# Patient Record
Sex: MALE | Race: WHITE | URBAN - METROPOLITAN AREA
[De-identification: names, ages, dates, MRNs, and addresses within clinical notes are randomized per-mention and may not be internally consistent; named-entity substitution may affect disease eponyms.]

---

## 2017-04-04 ENCOUNTER — OFFICE VISIT (OUTPATIENT)
Dept: FAMILY MEDICINE | Facility: CLINIC | Age: 47
End: 2017-04-04

## 2017-04-04 VITALS
SYSTOLIC BLOOD PRESSURE: 136 MMHG | DIASTOLIC BLOOD PRESSURE: 82 MMHG | BODY MASS INDEX: 31.48 KG/M2 | OXYGEN SATURATION: 95 % | HEART RATE: 88 BPM | WEIGHT: 204 LBS

## 2017-04-04 DIAGNOSIS — I10 BENIGN ESSENTIAL HYPERTENSION: ICD-10-CM

## 2017-04-04 PROCEDURE — 99213 OFFICE O/P EST LOW 20 MIN: CPT | Performed by: FAMILY MEDICINE

## 2017-04-04 RX ORDER — CYCLOBENZAPRINE HCL 10 MG
10 TABLET ORAL
COMMUNITY
Start: 2017-03-20 | End: 2017-07-18

## 2017-04-04 RX ORDER — LISINOPRIL 10 MG/1
TABLET ORAL
Qty: 90 TABLET | Refills: 1 | Status: SHIPPED | OUTPATIENT
Start: 2017-04-04 | End: 2017-10-28

## 2017-04-04 NOTE — MR AVS SNAPSHOT
After Visit Summary   4/4/2017    Ulises Matthew    MRN: [id number]           Patient Information     Date Of Birth          1970        Visit Information        Provider Department      4/4/2017 2:15 PM Gabriele Heath MD [facility]        Today's Diagnoses     Benign essential hypertension          Care Instructions      Hypertension   What is hypertension?   Hypertension is blood pressure that keeps being higher than normal. Blood pressure is the force of blood against artery walls as the heart pumps blood through the body. Blood pressure can be unhealthy if it is above 120/80. The higher your blood pressure, the greater the health risk.   High blood pressure can be controlled if you take these steps:   Maintain a healthy weight.   Are physically active.   Follow a healthy eating plan, which includes foods that do not have a lot of salt and sodium.   Do not drink a lot of alcohol.   Our goal is to keep the systolic (top) number 138 or lower and the diastolic (bottom) number 83 or lower          Follow-ups after your visit        Who to contact     If you have questions or need follow up information about today's clinic visit or your schedule please contact [facility] directly at [phone].  Normal or non-critical lab and imaging results will be communicated to you by Skeeblehart, letter or phone within 4 business days after the clinic has received the results. If you do not hear from us within 7 days, please contact the clinic through Addvocatet or phone. If you have a critical or abnormal lab result, we will notify you by phone as soon as possible.  Submit refill requests through Ampex or call your pharmacy and they will forward the refill request to us. Please allow 3 business days for your refill to be completed.          Additional Information About Your Visit        SkeebleharMacroSolve Information     Ampex gives you secure access to your electronic health record. If you  see a primary care provider, you can also send messages to your care team and make appointments. If you have questions, please call your primary care clinic.  If you do not have a primary care provider, please call [phone] and they will assist you.        Care EveryWhere ID     This is your Care EveryWhere ID. This could be used by other organizations to access your [place] medical records  [id number]        Your Vitals Were     Pulse Pulse Oximetry BMI (Body Mass Index)             88 95% 31.48 kg/m2          Blood Pressure from Last 3 Encounters:   04/04/17 136/82   07/12/16 136/86   06/09/16 (!) 189/100    Weight from Last 3 Encounters:   04/04/17 92.5 kg (204 lb)   07/12/16 91.6 kg (202 lb)   06/09/16 90.7 kg (200 lb)              Today, you had the following     No orders found for display         Today's Medication Changes          These changes are accurate as of: 4/4/17  2:32 PM.  If you have any questions, ask your nurse or doctor.               These medicines have changed or have updated prescriptions.        Dose/Directions    lisinopril 10 MG tablet   Commonly known as:  PRINIVIL/ZESTRIL   This may have changed:  medication strength   Used for:  Benign essential hypertension   Changed by:  Gabriele Heath MD        TAKE 1 TABLET (5 MG) BY MOUTH DAILY   Quantity:  90 tablet   Refills:  1            Where to get your medicines      These medications were sent to [facility][id number] - [place], [place] - [address]  [address] [facility] [address]     Phone:  [phone]     lisinopril 10 MG tablet                Primary Care Provider Office Phone # Fax #    Gabriele Heath [phone]       [facility] [address]  [facility] [address]        Thank you!     Thank you for choosing [facility]  for your care. Our goal is always to provide you with excellent care. Hearing back from our patients is one way we can continue to improve our  services. Please take a few minutes to complete the written survey that you may receive in the mail after your visit with us. Thank you!             Your Updated Medication List - Protect others around you: Learn how to safely use, store and throw away your medicines at www.disposemymeds.org.          This list is accurate as of: 4/4/17  2:32 PM.  Always use your most recent med list.                   Brand Name Dispense Instructions for use    cyclobenzaprine 10 MG tablet    FLEXERIL     Take 10 mg by mouth Reported on 4/4/2017       lisinopril 10 MG tablet    PRINIVIL/ZESTRIL    90 tablet    TAKE 1 TABLET (5 MG) BY MOUTH DAILY

## 2017-04-04 NOTE — PATIENT INSTRUCTIONS
Hypertension   What is hypertension?   Hypertension is blood pressure that keeps being higher than normal. Blood pressure is the force of blood against artery walls as the heart pumps blood through the body. Blood pressure can be unhealthy if it is above 120/80. The higher your blood pressure, the greater the health risk.   High blood pressure can be controlled if you take these steps:   Maintain a healthy weight.   Are physically active.   Follow a healthy eating plan, which includes foods that do not have a lot of salt and sodium.   Do not drink a lot of alcohol.   Our goal is to keep the systolic (top) number 138 or lower and the diastolic (bottom) number 83 or lower

## 2017-04-04 NOTE — PROGRESS NOTES
he has Hypertension which is currently not well controlled. he has been compliant with his medications and is here today to follow up on the this issue and see if we can't work on better control of the blood pressure.  Donating blood is always found to be over 140/90  Reviewed last 6 BP readings in chart:  BP Readings from Last 6 Encounters:   04/04/17 136/82   07/12/16 136/86   06/09/16 (!) 189/100   11/18/14 141/84   11/11/14 (!) 162/92   12/13/13 132/82     he  has not experienced any significant side effects from current medications for hypertension.    NO active cardiac complaints or symptoms with exercise.    When made the appt. Had back back pain, muscle relaxants have helped.  Problem(s) Oriented visit      ROS:  5 point ROS completed and negative except noted above, including Gen, CV, Resp, GI, MS     HISTORY:   reports that he drinks about 1.0 oz of alcohol per week    reports that he has never smoked. He does not have any smokeless tobacco history on file.    Past Medical History:   Diagnosis Date     HTN (hypertension) 2009     Myopia      Other and unspecified hyperlipidemia      Shingles 2010     Past Surgical History:   Procedure Laterality Date     NO HISTORY OF SURGERY         EXAM:  BP: 136/82   Pulse: 88    Temp: Data Unavailable    Wt Readings from Last 2 Encounters:   04/04/17 92.5 kg (204 lb)   07/12/16 91.6 kg (202 lb)       BMI= Body mass index is 31.48 kg/(m^2).    EXAM:  APPEARANCE: = Relaxed and in no distress  Conj/Eyelids = noninjected and lids and lashes are without inflammation  PERRLA/Irises = Pupils Round Reactive to Light and Irisis without inflammation  Neck = No anterior or posterior adenopathy appreciated.  Thyroid = Not enlarged and no masses felt  Resp effort = Calm regular breathing  Breath Sounds = Good air movement with no rales or rhonchi in any lung fields  Heart Rate, Rythym, & sounds (no Murm)  = Regular rate and rythym with no S3, S4, or murmer appreciated.  Carotid  "Art's = Pulses full and equal and no bruits appreciated  Abdomen = Soft, nontender, no masses, & bowel sounds in all quadrants  Liver/Spleen = Normal span and no splenomegaly noted  Digits and Nails = FROM in all finger joints, no nail dystrophy  Ext (edema) = No pretibial edema noted or elsewhere  Musculsktl =  Strength and ROM of major joints are within normal limits  SKIN = absent significant rashes or lesions   Recent/Remote Memory = Alert and Oriented x 3  Mood/Affect = Cooperative and interested      Assessment/Plan:  Ulises was seen today for recheck medication and back pain.    Diagnoses and all orders for this visit:    Benign essential hypertension  -     lisinopril (PRINIVIL/ZESTRIL) 10 MG tablet; TAKE 1 TABLET (5 MG) BY MOUTH DAILY        COUNSELING:   reports that he has never smoked. He does not have any smokeless tobacco history on file.    Estimated body mass index is 31.48 kg/(m^2) as calculated from the following:    Height as of 12/13/13: 1.715 m (5' 7.5\").    Weight as of this encounter: 92.5 kg (204 lb).       Appropriate preventive services were discussed with this patient, including applicable screening as appropriate for cardiovascular disease, diabetes, osteopenia/osteoporosis, and glaucoma.  As appropriate for age/gender, discussed screening for colorectal cancer, prostate cancer, breast cancer, and cervical cancer. Checklist reviewing preventive services available has been given to the patient.    Reviewed patients plan of care and provided an AVS. The Basic Care Plan (routine screening as documented in Health Maintenance) for Ulises meets the Care Plan requirement. This Care Plan has been established and reviewed with the  Patient.      The following health maintenance items are reviewed in Epic and correct as of today:  Health Maintenance   Topic Date Due     INFLUENZA VACCINE (SYSTEM ASSIGNED)  09/01/2017     TETANUS IMMUNIZATION (SYSTEM ASSIGNED)  04/06/2019     LIPID SCREEN Q5 YR MALE " (SYSTEM ASSIGNED)  07/05/2021       Gabriele Heath  [facility]  For any issues my office # is [phone]

## 2017-06-29 DIAGNOSIS — Z01.89 ROUTINE LAB DRAW: Primary | ICD-10-CM

## 2017-06-29 DIAGNOSIS — Z12.5 SCREENING FOR PROSTATE CANCER: ICD-10-CM

## 2017-06-29 PROCEDURE — 36415 COLL VENOUS BLD VENIPUNCTURE: CPT | Performed by: FAMILY MEDICINE

## 2017-06-29 PROCEDURE — 84153 ASSAY OF PSA TOTAL: CPT | Mod: 90 | Performed by: FAMILY MEDICINE

## 2017-06-29 PROCEDURE — 80053 COMPREHEN METABOLIC PANEL: CPT | Mod: 90 | Performed by: FAMILY MEDICINE

## 2017-06-29 PROCEDURE — 80061 LIPID PANEL: CPT | Mod: 90 | Performed by: FAMILY MEDICINE

## 2017-06-30 LAB
ALBUMIN SERPL-MCNC: 4.5 G/DL (ref 3.5–5.5)
ALBUMIN/GLOB SERPL: 2 {RATIO} (ref 1.2–2.2)
ALP SERPL-CCNC: 57 IU/L (ref 39–117)
ALT SERPL-CCNC: 37 IU/L (ref 0–44)
AST SERPL-CCNC: 26 IU/L (ref 0–40)
BILIRUB SERPL-MCNC: 0.3 MG/DL (ref 0–1.2)
BUN SERPL-MCNC: 13 MG/DL (ref 6–24)
BUN/CREATININE RATIO: 11 (ref 9–20)
CALCIUM SERPL-MCNC: 9.4 MG/DL (ref 8.7–10.2)
CHLORIDE SERPLBLD-SCNC: 98 MMOL/L (ref 96–106)
CHOLEST SERPL-MCNC: 195 MG/DL (ref 100–199)
CREAT SERPL-MCNC: 1.22 MG/DL (ref 0.76–1.27)
EGFR IF AFRICN AM: 80 ML/MIN/1.73
EGFR IF NONAFRICN AM: 69 ML/MIN/1.73
GLOBULIN, TOTAL: 2.2 G/DL (ref 1.5–4.5)
GLUCOSE SERPL-MCNC: 108 MG/DL (ref 65–99)
HDLC SERPL-MCNC: 42 MG/DL
LDL/HDL RATIO: 2 RATIO UNITS (ref 0–3.6)
LDLC SERPL CALC-MCNC: 84 MG/DL (ref 0–99)
POTASSIUM SERPL-SCNC: 4.7 MMOL/L (ref 3.5–5.2)
PROT SERPL-MCNC: 6.7 G/DL (ref 6–8.5)
PSA NG/ML: 1.3 NG/ML (ref 0–4)
SODIUM SERPL-SCNC: 139 MMOL/L (ref 134–144)
TOTAL CO2: 24 MMOL/L (ref 18–28)
TRIGL SERPL-MCNC: 343 MG/DL (ref 0–149)
VLDLC SERPL CALC-MCNC: 69 MG/DL (ref 5–40)

## 2017-06-30 NOTE — PROGRESS NOTES
Dear Ulises,  Here is a copy of your labs, we will discuss them at your upcoming visit.  Gabriele Heath MD

## 2017-07-18 ENCOUNTER — OFFICE VISIT (OUTPATIENT)
Dept: FAMILY MEDICINE | Facility: CLINIC | Age: 47
End: 2017-07-18

## 2017-07-18 VITALS
DIASTOLIC BLOOD PRESSURE: 68 MMHG | WEIGHT: 205.2 LBS | HEIGHT: 67 IN | BODY MASS INDEX: 32.21 KG/M2 | SYSTOLIC BLOOD PRESSURE: 122 MMHG | OXYGEN SATURATION: 98 % | RESPIRATION RATE: 16 BRPM | HEART RATE: 61 BPM

## 2017-07-18 DIAGNOSIS — E66.9 OBESITY (BMI 30-39.9): ICD-10-CM

## 2017-07-18 DIAGNOSIS — Z00.00 ROUTINE GENERAL MEDICAL EXAMINATION AT A HEALTH CARE FACILITY: Primary | ICD-10-CM

## 2017-07-18 DIAGNOSIS — I10 BENIGN ESSENTIAL HYPERTENSION: ICD-10-CM

## 2017-07-18 PROCEDURE — 99396 PREV VISIT EST AGE 40-64: CPT | Performed by: FAMILY MEDICINE

## 2017-07-18 NOTE — PROGRESS NOTES
SUBJECTIVE:   CC: Ulises Matthew is an 47 year old male who presents for preventative health visit.   General aches & pains, age-related concerns  Healthy Habits:    Do you get at least three servings of calcium containing foods daily (dairy, green leafy vegetables, etc.)? yes    Amount of exercise or daily activities, outside of work: 4 day(s) per week    Problems taking medications regularly No    Medication side effects: No    Have you had an eye exam in the past two years? no    Do you see a dentist twice per year? yes    Do you have sleep apnea, excessive snoring or daytime drowsiness?no        General aches & pains and age related concerns    Today's PHQ-2 Score:   PHQ-2 ( 1999 Pfizer) 7/18/2017   Q1: Little interest or pleasure in doing things 0   Q2: Feeling down, depressed or hopeless 0   PHQ-2 Score 0       Abuse: Current or Past(Physical, Sexual or Emotional)- No  Do you feel safe in your environment - Yes    Social History   Substance Use Topics     Smoking status: Never Smoker     Smokeless tobacco: Never Used     Alcohol use 1.2 - 1.8 oz/week     2 - 3 Cans of beer per week      Comment: light social     The patient does not drink >3 drinks per day nor >7 drinks per week.    Last PSA: No results found for: PSA    Reviewed orders with patient. Reviewed health maintenance and updated orders accordingly - Yes  Lab results      Reviewed and updated as needed this visit by clinical staff  Tobacco  Allergies  Meds         Reviewed and updated as needed this visit by Provider        Past Medical History:   Diagnosis Date     HTN (hypertension) 2009    Resolved with weight loss in 2011     Myopia      Other and unspecified hyperlipidemia      Shingles 2010    Facial, still on eye drops      Past Surgical History:   Procedure Laterality Date     NO HISTORY OF SURGERY       VASECTOMY  2015       ROS:  C: NEGATIVE for fever, chills, change in weight  I: NEGATIVE for worrisome rashes, moles or lesions  E:  "NEGATIVE for vision changes or irritation  ENT: NEGATIVE for ear, mouth and throat problems  R: NEGATIVE for significant cough or SOB  CV: NEGATIVE for chest pain, palpitations or peripheral edema  GI: NEGATIVE for nausea, abdominal pain, heartburn, or change in bowel habits   male: negative for dysuria, hematuria, decreased urinary stream, erectile dysfunction, urethral discharge  M: NEGATIVE for significant arthralgias or myalgia  N: NEGATIVE for weakness, dizziness or paresthesias  P: NEGATIVE for changes in mood or affect    OBJECTIVE:   /68  Pulse 61  Resp 16  Ht 1.702 m (5' 7\")  Wt 93.1 kg (205 lb 3.2 oz)  SpO2 98%  BMI 32.14 kg/m2  EXAM:  GENERAL: healthy, alert and no distress  EYES: Eyes grossly normal to inspection, PERRL and conjunctivae and sclerae normal  HENT: ear canals and TM's normal, nose and mouth without ulcers or lesions  NECK: no adenopathy, no asymmetry, masses, or scars and thyroid normal to palpation  RESP: lungs clear to auscultation - no rales, rhonchi or wheezes  CV: regular rate and rhythm, normal S1 S2, no S3 or S4, no murmur, click or rub, no peripheral edema and peripheral pulses strong  ABDOMEN: soft, nontender, no hepatosplenomegaly, no masses and bowel sounds normal   (male): normal male genitalia without lesions or urethral discharge, no hernia  RECTAL: normal sphincter tone, no rectal masses, prostate normal size, smooth, nontender without nodules or masses  MS: no gross musculoskeletal defects noted, no edema  SKIN: no suspicious lesions or rashes  NEURO: Normal strength and tone, mentation intact and speech normal  PSYCH: mentation appears normal, affect normal/bright    ASSESSMENT/PLAN:   Ulises was seen today for physical.    Diagnoses and all orders for this visit:    Routine general medical examination at a health care facility    Benign essential hypertension    Obesity (BMI 30-39.9)    Other orders  -     Cancel: Can Do Program " "Referral      COUNSELING:  Reviewed preventive health counseling, as reflected in patient instructions       Regular exercise       Healthy diet/nutrition         reports that he has never smoked. He has never used smokeless tobacco.    Estimated body mass index is 32.14 kg/(m^2) as calculated from the following:    Height as of this encounter: 1.702 m (5' 7\").    Weight as of this encounter: 93.1 kg (205 lb 3.2 oz).   Weight management plan: Specific weight management program called WHEAT BELLY discussed and follow up in 3 months in clinic to re-evaluate.    Counseling Resources:  ATP IV Guidelines  Pooled Cohorts Equation Calculator  FRAX Risk Assessment  ICSI Preventive Guidelines  Dietary Guidelines for Americans, 2010  USDA's MyPlate  ASA Prophylaxis  Lung CA Screening    Gabriele Heath MD  [facility]  "

## 2017-07-18 NOTE — PATIENT INSTRUCTIONS
Wheat Belly: Lose the Wheat, Lose the Weight, and Find Your Path Back to Health by Dale Zayas (Aug 30, 2011)        Preventive Health Recommendations  Male Ages 40 to 49    Yearly exam:             See your health care provider every year in order to  o   Review health changes.   o   Discuss preventive care.    o   Review your medicines if your doctor has prescribed any.    You should be tested each year for STDs (sexually transmitted diseases) if you re at risk.     Have a cholesterol test every 5 years.     Have a colonoscopy (test for colon cancer) if someone in your family has had colon cancer or polyps before age 50.     After age 45, have a diabetes test (fasting glucose). If you are at risk for diabetes, you should have this test every 3 years.      Talk with your health care provider about whether or not a prostate cancer screening test (PSA) is right for you.    Shots: Get a flu shot each year. Get a tetanus shot every 10 years.     Nutrition:    Eat at least 5 servings of fruits and vegetables daily.     Eat whole-grain bread, whole-wheat pasta and brown rice instead of white grains and rice.     Talk to your provider about Calcium and Vitamin D.     Lifestyle    Exercise for at least 150 minutes a week (30 minutes a day, 5 days a week). This will help you control your weight and prevent disease.     Limit alcohol to one drink per day.     No smoking.     Wear sunscreen to prevent skin cancer.     See your dentist every six months for an exam and cleaning.

## 2017-07-18 NOTE — MR AVS SNAPSHOT
After Visit Summary   7/18/2017    Ulises Matthew    MRN: [id number]           Patient Information     Date Of Birth          1970        Visit Information        Provider Department      7/18/2017 3:15 PM Gabriele Heath MD [facility]        Today's Diagnoses     Routine general medical examination at a health care facility    -  1    Benign essential hypertension        Obesity (BMI 30-39.9)          Care Instructions      Wheat Belly: Lose the Wheat, Lose the Weight, and Find Your Path Back to Health by Dale Zayas (Aug 30, 2011)        Preventive Health Recommendations  Male Ages 40 to 49    Yearly exam:             See your health care provider every year in order to  o   Review health changes.   o   Discuss preventive care.    o   Review your medicines if your doctor has prescribed any.    You should be tested each year for STDs (sexually transmitted diseases) if you re at risk.     Have a cholesterol test every 5 years.     Have a colonoscopy (test for colon cancer) if someone in your family has had colon cancer or polyps before age 50.     After age 45, have a diabetes test (fasting glucose). If you are at risk for diabetes, you should have this test every 3 years.      Talk with your health care provider about whether or not a prostate cancer screening test (PSA) is right for you.    Shots: Get a flu shot each year. Get a tetanus shot every 10 years.     Nutrition:    Eat at least 5 servings of fruits and vegetables daily.     Eat whole-grain bread, whole-wheat pasta and brown rice instead of white grains and rice.     Talk to your provider about Calcium and Vitamin D.     Lifestyle    Exercise for at least 150 minutes a week (30 minutes a day, 5 days a week). This will help you control your weight and prevent disease.     Limit alcohol to one drink per day.     No smoking.     Wear sunscreen to prevent skin cancer.     See your dentist every six months for an exam and  "cleaning.              Follow-ups after your visit        Who to contact     If you have questions or need follow up information about today's clinic visit or your schedule please contact [facility] directly at [phone].  Normal or non-critical lab and imaging results will be communicated to you by Agilyxhart, letter or phone within 4 business days after the clinic has received the results. If you do not hear from us within 7 days, please contact the clinic through Agilyxhart or phone. If you have a critical or abnormal lab result, we will notify you by phone as soon as possible.  Submit refill requests through Juice In The City or call your pharmacy and they will forward the refill request to us. Please allow 3 business days for your refill to be completed.          Additional Information About Your Visit        AgilyxharPeople Pattern Information     [employer] gives you secure access to your electronic health record. If you see a primary care provider, you can also send messages to your care team and make appointments. If you have questions, please call your primary care clinic.  If you do not have a primary care provider, please call [phone] and they will assist you.        Care EveryWhere ID     This is your Care EveryWhere ID. This could be used by other organizations to access your [place] medical records  [id number]        Your Vitals Were     Pulse Respirations Height Pulse Oximetry BMI (Body Mass Index)       61 16 1.702 m (5' 7\") 98% 32.14 kg/m2        Blood Pressure from Last 3 Encounters:   07/18/17 122/68   04/04/17 136/82   07/12/16 136/86    Weight from Last 3 Encounters:   07/18/17 93.1 kg (205 lb 3.2 oz)   04/04/17 92.5 kg (204 lb)   07/12/16 91.6 kg (202 lb)              Today, you had the following     No orders found for display         Today's Medication Changes          These changes are accurate as of: 7/18/17  4:10 PM.  If you have any questions, ask your nurse or doctor.               These medicines " have changed or have updated prescriptions.        Dose/Directions    lisinopril 10 MG tablet   Commonly known as:  PRINIVIL/ZESTRIL   This may have changed:    - how much to take  - how to take this  - when to take this  - additional instructions   Used for:  Benign essential hypertension        TAKE 1 TABLET (5 MG) BY MOUTH DAILY   Quantity:  90 tablet   Refills:  1         Stop taking these medicines if you haven't already. Please contact your care team if you have questions.     cyclobenzaprine 10 MG tablet   Commonly known as:  FLEXERIL   Stopped by:  Gabriele Heath MD                    Primary Care Provider Office Phone # Fax #    Gabriele Heath [phone]       [facility] [address] [place] [address]        Equal Access to Services     [facility]: Hadii mditry crouch hadasho Soomaali, waaxda luqadaha, qaybta kaalmada adeegyada, jory sousa haygeovanna alexandra . [facility] [phone].    ATENCIÓN: Si habla español, tiene a gamboa disposición servicios gratuitos de asistencia lingüística. Llame al [phone].    We comply with applicable federal civil rights laws and [place] laws. We do not discriminate on the basis of race, color, national origin, age, disability sex, sexual orientation or gender identity.            Thank you!     Thank you for choosing [facility]  for your care. Our goal is always to provide you with excellent care. Hearing back from our patients is one way we can continue to improve our services. Please take a few minutes to complete the written survey that you may receive in the mail after your visit with us. Thank you!             Your Updated Medication List - Protect others around you: Learn how to safely use, store and throw away your medicines at www.disposemymeds.org.          This list is accurate as of: 7/18/17  4:10 PM.  Always use your most recent med list.                   Brand Name Dispense Instructions  for use Diagnosis    lisinopril 10 MG tablet    PRINIVIL/ZESTRIL    90 tablet    TAKE 1 TABLET (5 MG) BY MOUTH DAILY    Benign essential hypertension

## 2017-10-24 ENCOUNTER — OFFICE VISIT (OUTPATIENT)
Dept: FAMILY MEDICINE | Facility: CLINIC | Age: 47
End: 2017-10-24

## 2017-10-24 VITALS
WEIGHT: 203 LBS | BODY MASS INDEX: 31.79 KG/M2 | SYSTOLIC BLOOD PRESSURE: 115 MMHG | OXYGEN SATURATION: 96 % | TEMPERATURE: 97.6 F | HEART RATE: 85 BPM | DIASTOLIC BLOOD PRESSURE: 80 MMHG

## 2017-10-24 DIAGNOSIS — G44.209 TENSION HEADACHE: Primary | ICD-10-CM

## 2017-10-24 DIAGNOSIS — N52.9 ERECTILE DYSFUNCTION, UNSPECIFIED ERECTILE DYSFUNCTION TYPE: ICD-10-CM

## 2017-10-24 LAB — ERYTHROCYTE [SEDIMENTATION RATE] IN BLOOD: 17 MM/HR (ref 0–15)

## 2017-10-24 PROCEDURE — 36415 COLL VENOUS BLD VENIPUNCTURE: CPT | Performed by: FAMILY MEDICINE

## 2017-10-24 PROCEDURE — 99214 OFFICE O/P EST MOD 30 MIN: CPT | Performed by: FAMILY MEDICINE

## 2017-10-24 PROCEDURE — 85651 RBC SED RATE NONAUTOMATED: CPT | Performed by: FAMILY MEDICINE

## 2017-10-24 RX ORDER — TADALAFIL 10 MG/1
10 TABLET ORAL DAILY PRN
Qty: 12 TABLET | Refills: 11 | Status: SHIPPED | OUTPATIENT
Start: 2017-10-24

## 2017-10-24 NOTE — PATIENT INSTRUCTIONS
Neck Spasm Rehabilitation Exercises   You may do all of these exercises right away but avoid any movements that increase your pain.     Neck rotation with flexion:   Right: Turn your head to the right and clasp your hands behind your head. Let the weight of your arms pull your chin to the right side of your chest. Relax. Hold for a count of 15. Do this 3 times.   Left: Turn your head to the left and clasp your hands behind your head. Let the weight of your arms pull your chin to the left side of your chest. Relax. Hold for a count of 15. Do this 3 times.     Chin tuck: Place your fingertips on your chin and gently push your head straight back as if you are trying to make a double chin. Keep looking forward as your head moves back. Hold 5 seconds and repeat 5 times.     Scalene stretch: This stretches the neck muscles that attach to your ribs. Sitting in an upright position, clasp both hands behind your back, lower your left shoulder, and tilt your head toward the right. Hold this position for 15 to 30 seconds and then come back to the starting position. Lower your right shoulder and tilt your head toward the left until you feel a stretch. Hold for 15 to 30 seconds. Repeat 3 times on each side.     Neck rotation stretch   Right side: Rotate your neck by looking over your right shoulder. Lift your right hand and place your palm on the left side of your chin. Push your chin with your palm toward your right shoulder. Hold for a count of 10. Do this 3 times.   Left side: Rotate your neck by looking over your left shoulder. Lift your left hand and place your palm on the right side of your chin. Push your chin with your palm toward your left shoulder. Hold for a count of 10. Do this 3 times.     Scapular squeeze: While sitting or standing with your arms by your sides, squeeze your shoulder blades together and hold for 5 seconds. Do 3 sets of 10.     Thoracic extension: While sitting in a chair, clasp both  arms behind your head. Gently arch backward and look up toward the ceiling. Repeat 10 times. Do this several times per day.

## 2017-10-24 NOTE — PROGRESS NOTES
Headache    Onset: 2 week(s) ago    Description:                 Location: unilateral in the right occipital area   Character: throbbing pain                 Frequency:  2-3                 Pain scale rating:3/10                 Are headaches getting more intense or more frequent: no    Precipitating and/or Alleviating factors:        How much caffeine do you use daily:  2 cups in the morning  Does movement, bending over, increase pain: no  Does light/sound make it worse: no  Does sleep help: YES    Do you wake up with a headache or does it wake you from sleep: no                 Are you able to do daily activities: work is ok but if sitting at home    Accompanying Signs & Symptoms:   Stiff neck: no  Fever: no  Sinus pressure: no  Nausea or vomiting: no  Dizziness: no  Numbness: no  Weakness: no  Visual changes: no    History:    Any head trauma: no  Any family history of migraines: no  Any previous tests/evaluation  for headaches: no  Medications tried and outcome:  Tylenol with moderate relief    Pt. reports difficulty achieving and maintaining erections.  Denies other specific  complaints.  Has not tried VIAGRA or similar medication.  Is interested in trying VIAGRA.  Problem(s) Oriented visit      ROS:  General and Resp. completed and negative except as noted above     HISTORY:   reports that he drinks about 1.2 - 1.8 oz of alcohol per week    reports that he has never smoked. He has never used smokeless tobacco.    Past Medical History:   Diagnosis Date     HTN (hypertension) 2009     Myopia      Other and unspecified hyperlipidemia      Shingles 2010     Past Surgical History:   Procedure Laterality Date     NO HISTORY OF SURGERY       VASECTOMY  2015       EXAM:  BP: 115/80   Pulse: 85    Temp: 97.6    Wt Readings from Last 2 Encounters:   10/24/17 92.1 kg (203 lb)   07/18/17 93.1 kg (205 lb 3.2 oz)       BMI= Body mass index is 31.79 kg/(m^2).    EXAM:  APPEARANCE: = Relaxed and in no distress  Conj/Eyelids  "= noninjected and lids and lashes are without inflammation  PERRLA/Irises = Pupils Round Reactive to Light and Irisis without inflammation  Ears/Nose = External structures and Nares have usual shape and form  Ear canals and TM's = Canals are not inflammed and have none or little wax and the drums are not injected and have a light reflex   Lips/Teeth/Gums = No lesions seen, good dentition, and gums seem healthy  Oropharynx = No leukoplakia, No injection to the tissues, Normal Uvula  Neck = No anterior or posterior adenopathy appreciated.  Thyroid = Not enlarged and no masses felt  Resp effort = Calm regular breathing  Breath Sounds = Good air movement with no rales or rhonchi in any lung fields  Heart Rate, Rythym, & sounds (no Murm)  = Regular rate and rythym with no S3, S4, or murmer appreciated.  Carotid Art's = Pulses full and equal and no bruits appreciated  Abdomen = Soft, nontender, no masses, & bowel sounds in all quadrants  Liver/Spleen = Normal span and no splenomegaly noted  Digits and Nails = FROM in all finger joints, no nail dystrophy  Ext (edema) = No pretibial edema noted or elsewhere  Musculsktl =  Strength and ROM of major joints are within normal limits  SKIN = absent significant rashes or lesions   Recent/Remote Memory = Alert and Oriented x 3  Mood/Affect = Cooperative and interested      Assessment/Plan:  Ulises was seen today for headache and uri.    Diagnoses and all orders for this visit:    Tension headache  -     Sed Rate Westergren ([facility])    Erectile dysfunction, unspecified erectile dysfunction type  -     tadalafil (CIALIS) 10 MG tablet; Take 1 tablet (10 mg) by mouth daily as needed prior to sex. Do not use with nitroglycerin, terazosin or doxazosin.        COUNSELING:   reports that he has never smoked. He has never used smokeless tobacco.    Estimated body mass index is 31.79 kg/(m^2) as calculated from the following:    Height as of 7/18/17: 1.702 m (5' 7\").    Weight as of this " encounter: 92.1 kg (203 lb).         Appropriate preventive services were discussed with this patient, including applicable screening as appropriate for cardiovascular disease, diabetes, osteopenia/osteoporosis, and glaucoma.  As appropriate for age/gender, discussed screening for colorectal cancer, prostate cancer, breast cancer, and cervical cancer. Checklist reviewing preventive services available has been given to the patient.    Reviewed patients plan of care and provided an AVS. The Basic Care Plan (routine screening as documented in Health Maintenance) for Ulises meets the Care Plan requirement. This Care Plan has been established and reviewed with the  Patient.      The following health maintenance items are reviewed in Epic and correct as of today:Health Maintenance   Topic Date Due     TETANUS IMMUNIZATION (SYSTEM ASSIGNED)  04/06/2019     LIPID SCREEN Q5 YR MALE (SYSTEM ASSIGNED)  06/29/2022     INFLUENZA VACCINE (SYSTEM ASSIGNED)  Completed       Gabriele Heath  [facility]  For any issues my office # is [phone]

## 2017-10-24 NOTE — MR AVS SNAPSHOT
After Visit Summary   10/24/2017    Ulises Matthew    MRN: [id number]           Patient Information     Date Of Birth          1970        Visit Information        Provider Department      10/24/2017 3:00 PM Gabriele Heath MD [facility]        Today's Diagnoses     Tension headache    -  1    Erectile dysfunction, unspecified erectile dysfunction type          Care Instructions                  Neck Spasm Rehabilitation Exercises   You may do all of these exercises right away but avoid any movements that increase your pain.     Neck rotation with flexion:   Right: Turn your head to the right and clasp your hands behind your head. Let the weight of your arms pull your chin to the right side of your chest. Relax. Hold for a count of 15. Do this 3 times.   Left: Turn your head to the left and clasp your hands behind your head. Let the weight of your arms pull your chin to the left side of your chest. Relax. Hold for a count of 15. Do this 3 times.     Chin tuck: Place your fingertips on your chin and gently push your head straight back as if you are trying to make a double chin. Keep looking forward as your head moves back. Hold 5 seconds and repeat 5 times.     Scalene stretch: This stretches the neck muscles that attach to your ribs. Sitting in an upright position, clasp both hands behind your back, lower your left shoulder, and tilt your head toward the right. Hold this position for 15 to 30 seconds and then come back to the starting position. Lower your right shoulder and tilt your head toward the left until you feel a stretch. Hold for 15 to 30 seconds. Repeat 3 times on each side.     Neck rotation stretch   Right side: Rotate your neck by looking over your right shoulder. Lift your right hand and place your palm on the left side of your chin. Push your chin with your palm toward your right shoulder. Hold for a count of 10. Do this 3 times.   Left side: Rotate your neck by  looking over your left shoulder. Lift your left hand and place your palm on the right side of your chin. Push your chin with your palm toward your left shoulder. Hold for a count of 10. Do this 3 times.     Scapular squeeze: While sitting or standing with your arms by your sides, squeeze your shoulder blades together and hold for 5 seconds. Do 3 sets of 10.     Thoracic extension: While sitting in a chair, clasp both arms behind your head. Gently arch backward and look up toward the ceiling. Repeat 10 times. Do this several times per day.                      Follow-ups after your visit        Who to contact     If you have questions or need follow up information about today's clinic visit or your schedule please contact [facility] directly at [phone].  Normal or non-critical lab and imaging results will be communicated to you by [employer], letter or phone within 4 business days after the clinic has received the results. If you do not hear from us within 7 days, please contact the clinic through Cuculust or phone. If you have a critical or abnormal lab result, we will notify you by phone as soon as possible.  Submit refill requests through [employer] or call your pharmacy and they will forward the refill request to us. Please allow 3 business days for your refill to be completed.          Additional Information About Your Visit        [employer] Information     [employer] gives you secure access to your electronic health record. If you see a primary care provider, you can also send messages to your care team and make appointments. If you have questions, please call your primary care clinic.  If you do not have a primary care provider, please call [phone] and they will assist you.        Care EveryWhere ID     This is your Care EveryWhere ID. This could be used by other organizations to access your [facility] medical records  [id number]        Your Vitals Were     Pulse Temperature Pulse Oximetry BMI (Body  Mass Index)          85 97.6  F (36.4  C) 96% 31.79 kg/m2         Blood Pressure from Last 3 Encounters:   10/24/17 115/80   07/18/17 122/68   04/04/17 136/82    Weight from Last 3 Encounters:   10/24/17 92.1 kg (203 lb)   07/18/17 93.1 kg (205 lb 3.2 oz)   04/04/17 92.5 kg (204 lb)              We Performed the Following     Sed Rate Westergren ([facility])          Today's Medication Changes          These changes are accurate as of: 10/24/17  3:52 PM.  If you have any questions, ask your nurse or doctor.               Start taking these medicines.        Dose/Directions    tadalafil 10 MG tablet   Commonly known as:  CIALIS   Used for:  Erectile dysfunction, unspecified erectile dysfunction type   Started by:  Gabriele Heath MD        Dose:  10 mg   Take 1 tablet (10 mg) by mouth daily as needed prior to sex. Do not use with nitroglycerin, terazosin or doxazosin.   Quantity:  12 tablet   Refills:  11         These medicines have changed or have updated prescriptions.        Dose/Directions    lisinopril 10 MG tablet   Commonly known as:  PRINIVIL/ZESTRIL   This may have changed:    - how much to take  - how to take this  - when to take this  - additional instructions   Used for:  Benign essential hypertension        TAKE 1 TABLET (5 MG) BY MOUTH DAILY   Quantity:  90 tablet   Refills:  1            Where to get your medicines      Some of these will need a paper prescription and others can be bought over the counter.  Ask your nurse if you have questions.     Bring a paper prescription for each of these medications     tadalafil 10 MG tablet                Primary Care Provider Office Phone # Fax #    Gabriele Heath [phone] [address]  [facility] [address]        Equal Access to Services     JESUS HONG AH: Jes Tejada, flavia duarte, cristy sanchez, jory winchester. [facility] [phone].    ATENCIÓN: Jess gomez  español, tiene a gamboa disposición servicios gratuitos de asistencia lingüística. Denisa ibanez [phone].    We comply with applicable federal civil rights laws and [place] laws. We do not discriminate on the basis of race, color, national origin, age, disability, sex, sexual orientation, or gender identity.            Thank you!     Thank you for choosing [facility]  for your care. Our goal is always to provide you with excellent care. Hearing back from our patients is one way we can continue to improve our services. Please take a few minutes to complete the written survey that you may receive in the mail after your visit with us. Thank you!             Your Updated Medication List - Protect others around you: Learn how to safely use, store and throw away your medicines at www.disposemymeds.org.          This list is accurate as of: 10/24/17  3:52 PM.  Always use your most recent med list.                   Brand Name Dispense Instructions for use Diagnosis    lisinopril 10 MG tablet    PRINIVIL/ZESTRIL    90 tablet    TAKE 1 TABLET (5 MG) BY MOUTH DAILY    Benign essential hypertension       tadalafil 10 MG tablet    CIALIS    12 tablet    Take 1 tablet (10 mg) by mouth daily as needed prior to sex. Do not use with nitroglycerin, terazosin or doxazosin.    Erectile dysfunction, unspecified erectile dysfunction type

## 2017-10-26 NOTE — PROGRESS NOTES
Dear Ulises,   I am writing to report that your included test results are within expected ranges. I do not suggest that we make any changes at this time.    Gabriele Heath M.D.

## 2017-10-28 DIAGNOSIS — I10 BENIGN ESSENTIAL HYPERTENSION: ICD-10-CM

## 2017-10-29 RX ORDER — LISINOPRIL 10 MG/1
TABLET ORAL
Qty: 90 TABLET | Refills: 0 | Status: SHIPPED | OUTPATIENT
Start: 2017-10-29 | End: 2018-02-17

## 2017-11-14 NOTE — PROGRESS NOTES
"HCM:   Up-to-date    Cc: cough, never smoker   SUBJECTIVE:   Ulises Matthew is a 47 year old male who presents to clinic today for the following health issues:    Flu shot in September    RESPIRATORY SYMPTOMS      Duration: started on 10/20/2017; cold symptoms resolved beginning of November     Description  Productive cough clear, hoarse voice, no fevers, no cold symptoms     Severity: moderate    Accompanying signs and symptoms: see above     History (predisposing factors):  Never smoker , no COPD, no asthma     Precipitating or alleviating factors: None    Therapies tried and outcome:  Mucinex OTC - effective     No Sinus  No HA  No Fever  No chills/rashes  Occasional wheezing    Sleep 6-7  Appetite ok  Exercise down from normal, gym treadmill and elliptical 30 min 2 times last week    Travel no  Pets cat    Smoking no  ETOH none for month  Street drugs/MJ no  Caffeine yes    Lisinopril not new                Problem list and histories reviewed & adjusted, as indicated.  Additional history: as documented        Reviewed and updated as needed this visit by clinical staff     Reviewed and updated as needed this visit by Provider         ROS:  Constitutional, HEENT, cardiovascular, pulmonary, GI, , musculoskeletal, neuro, skin, endocrine and psych systems are negative, except as otherwise noted.    Still able to play wind instrument trombone  OBJECTIVE:   /78  Pulse 72  Temp 98.2  F (36.8  C) (Oral)  Resp 16  Ht 1.702 m (5' 7\")  Wt 93 kg (205 lb)  SpO2 98%  BMI 32.11 kg/m2  Body mass index is 32.11 kg/(m^2).  GENERAL: healthy, alert and no distress  EYES: Eyes grossly normal to inspection, PERRL and conjunctivae and sclerae normal  HENT: ear canals and TM's bilateral cerumen, nose and mouth without ulcers or lesions  NECK: no adenopathy, no asymmetry, masses, or scars and thyroid normal to palpation  RESP: lungs clear to auscultation - no rales, rhonchi or wheezes  CV: regular rate and " rhythm, normal S1 S2, no S3 or S4, no murmur, click or rub, no peripheral edema and peripheral pulses strong  ABDOMEN: soft, nontender, no hepatosplenomegaly, no masses and bowel sounds normal  MS: no gross musculoskeletal defects noted, no edema  SKIN: no suspicious lesions or rashes  NEURO: Normal strength and tone, mentation intact and speech normal  PSYCH: mentation appears normal, affect normal/bright  LYMPH: no cervical, supraclavicular, axillary, or inguinal adenopathy    Diagnostic Test Results:  Results for orders placed or performed in visit on 10/24/17   Sed Rate [facility] ([facility])   Result Value Ref Range    Sed Rate 17 (A) 0 - 15 mm/hr       ASSESSMENT/PLAN:     ASSESSMENT / PLAN:  (J20.9) Acute bronchitis, unspecified organism  (primary encounter diagnosis)  Comment:   Plan: amoxicillin-clavulanate (AUGMENTIN) 875-125 MG         per tablet, benzonatate (TESSALON) 200 MG         capsule            (H61.23) Bilateral impacted cerumen  Comment:   Plan: ear wash        Patient Instructions   augmentin and tessalon perles for bronchitis      Emily Cavazos MD  [facility]

## 2017-11-16 ENCOUNTER — OFFICE VISIT (OUTPATIENT)
Dept: FAMILY MEDICINE | Facility: CLINIC | Age: 47
End: 2017-11-16

## 2017-11-16 VITALS
HEIGHT: 67 IN | OXYGEN SATURATION: 98 % | HEART RATE: 72 BPM | SYSTOLIC BLOOD PRESSURE: 116 MMHG | RESPIRATION RATE: 16 BRPM | TEMPERATURE: 98.2 F | WEIGHT: 205 LBS | BODY MASS INDEX: 32.18 KG/M2 | DIASTOLIC BLOOD PRESSURE: 78 MMHG

## 2017-11-16 DIAGNOSIS — H61.23 BILATERAL IMPACTED CERUMEN: ICD-10-CM

## 2017-11-16 DIAGNOSIS — J20.9 ACUTE BRONCHITIS, UNSPECIFIED ORGANISM: Primary | ICD-10-CM

## 2017-11-16 PROCEDURE — 99213 OFFICE O/P EST LOW 20 MIN: CPT | Mod: 25 | Performed by: FAMILY MEDICINE

## 2017-11-16 PROCEDURE — 69209 REMOVE IMPACTED EAR WAX UNI: CPT | Mod: LT | Performed by: FAMILY MEDICINE

## 2017-11-16 RX ORDER — BENZONATATE 200 MG/1
200 CAPSULE ORAL 3 TIMES DAILY PRN
Qty: 21 CAPSULE | Refills: 0 | Status: SHIPPED | OUTPATIENT
Start: 2017-11-16 | End: 2018-02-18

## 2017-11-16 NOTE — MR AVS SNAPSHOT
After Visit Summary   11/16/2017    Ulises Matthew    MRN: [id number]           Patient Information     Date Of Birth          1970        Visit Information        Provider Department      11/16/2017 8:15 AM Emily Cavazos MD [facility]        Today's Diagnoses     Acute bronchitis, unspecified organism    -  1      Care Instructions    augmentin and tessalon perles for bronchitis          Follow-ups after your visit        Follow-up notes from your care team     Return if symptoms worsen or fail to improve.      Who to contact     If you have questions or need follow up information about today's clinic visit or your schedule please contact [facility] directly at [phone].  Normal or non-critical lab and imaging results will be communicated to you by MyChart, letter or phone within 4 business days after the clinic has received the results. If you do not hear from us within 7 days, please contact the clinic through VeriFone[employer] or phone. If you have a critical or abnormal lab result, we will notify you by phone as soon as possible.  Submit refill requests through [employer] or call your pharmacy and they will forward the refill request to us. Please allow 3 business days for your refill to be completed.          Additional Information About Your Visit        MyChart Information     FIXO gives you secure access to your electronic health record. If you see a primary care provider, you can also send messages to your care team and make appointments. If you have questions, please call your primary care clinic.  If you do not have a primary care provider, please call [phone] and they will assist you.        Care EveryWhere ID     This is your Care EveryWhere ID. This could be used by other organizations to access your [facility] medical records  [id number]        Your Vitals Were     Pulse Temperature Respirations Height Pulse Oximetry BMI (Body Mass Index)    72 98.2  F  "(36.8  C) (Oral) 16 1.702 m (5' 7\") 98% 32.11 kg/m2       Blood Pressure from Last 3 Encounters:   11/16/17 116/78   10/24/17 115/80   07/18/17 122/68    Weight from Last 3 Encounters:   11/16/17 93 kg (205 lb)   10/24/17 92.1 kg (203 lb)   07/18/17 93.1 kg (205 lb 3.2 oz)              Today, you had the following     No orders found for display         Today's Medication Changes          These changes are accurate as of: 11/16/17  9:05 AM.  If you have any questions, ask your nurse or doctor.               Start taking these medicines.        Dose/Directions    amoxicillin-clavulanate 875-125 MG per tablet   Commonly known as:  AUGMENTIN   Used for:  Acute bronchitis, unspecified organism   Started by:  Emily Cavazos MD        Dose:  1 tablet   Take 1 tablet by mouth 2 times daily   Quantity:  28 tablet   Refills:  0       benzonatate 200 MG capsule   Commonly known as:  TESSALON   Used for:  Acute bronchitis, unspecified organism   Started by:  Emily Cavazos MD        Dose:  200 mg   Take 1 capsule (200 mg) by mouth 3 times daily as needed for cough   Quantity:  21 capsule   Refills:  0            Where to get your medicines      These medications were sent to [id number] - [place], [place] - [address] [place] [address]  [address] [place] [id number]     Phone:  [phone]     amoxicillin-clavulanate 875-125 MG per tablet    benzonatate 200 MG capsule                Primary Care Provider Office Phone # Fax #    Gabriele Heath [phone] [address]  [facility] [address]        Equal Access to Services     [facility]: Hadmarli Tejada, flavia duarte, qapaulyta kaalmazahra sanchez, jory winchester. [facility] [phone].    ATENCIÓN: Si habla español, tiene a gamboa disposición servicios gratuitos de asistencia lingüística. Llame al [phone].    We comply with applicable federal civil rights laws and [place] laws. We do not " discriminate on the basis of race, color, national origin, age, disability, sex, sexual orientation, or gender identity.            Thank you!     Thank you for choosing [facility]  for your care. Our goal is always to provide you with excellent care. Hearing back from our patients is one way we can continue to improve our services. Please take a few minutes to complete the written survey that you may receive in the mail after your visit with us. Thank you!             Your Updated Medication List - Protect others around you: Learn how to safely use, store and throw away your medicines at www.disposemymeds.org.          This list is accurate as of: 11/16/17  9:05 AM.  Always use your most recent med list.                   Brand Name Dispense Instructions for use Diagnosis    amoxicillin-clavulanate 875-125 MG per tablet    AUGMENTIN    28 tablet    Take 1 tablet by mouth 2 times daily    Acute bronchitis, unspecified organism       benzonatate 200 MG capsule    TESSALON    21 capsule    Take 1 capsule (200 mg) by mouth 3 times daily as needed for cough    Acute bronchitis, unspecified organism       lisinopril 10 MG tablet    PRINIVIL/ZESTRIL    90 tablet    TAKE ONE TABLET BY MOUTH ONE TIME DAILY    Benign essential hypertension       tadalafil 10 MG tablet    CIALIS    12 tablet    Take 1 tablet (10 mg) by mouth daily as needed prior to sex. Do not use with nitroglycerin, terazosin or doxazosin.    Erectile dysfunction, unspecified erectile dysfunction type

## 2018-02-17 ENCOUNTER — MYC REFILL (OUTPATIENT)
Dept: FAMILY MEDICINE | Facility: CLINIC | Age: 48
End: 2018-02-17

## 2018-02-17 DIAGNOSIS — I10 BENIGN ESSENTIAL HYPERTENSION: ICD-10-CM

## 2018-02-19 RX ORDER — LISINOPRIL 10 MG/1
10 TABLET ORAL DAILY
Qty: 90 TABLET | Refills: 1 | Status: SHIPPED | OUTPATIENT
Start: 2018-02-19 | End: 2018-08-16

## 2018-02-19 NOTE — TELEPHONE ENCOUNTER
Patient requesting refill on lisinopril 10mg qd. Last cpx/bp related visit was 7/2017.last CMP was 6/29/17.   Component      Latest Ref Rng & Units 6/29/2017   Glucose      65 - 99 mg/dL 108 (H)   Urea Nitrogen      6 - 24 mg/dL 13   Creatinine      0.76 - 1.27 mg/dL 1.22   eGFR If NonAfricn Am      >59 mL/min/1.73 69   eGFR If Africn Am      >59 mL/min/1.73 80   BUN/Creatinine Ratio      9 - 20 11   Sodium      134 - 144 mmol/L 139   Potassium      3.5 - 5.2 mmol/L 4.7   Chloride      96 - 106 mmol/L 98   Total CO2      18 - 28 mmol/L 24   Calcium      8.7 - 10.2 mg/dL 9.4   Protein Total      6.0 - 8.5 g/dL 6.7   Albumin      3.5 - 5.5 g/dL 4.5   Globulin, Total      1.5 - 4.5 g/dL 2.2   A/G Ratio      1.2 - 2.2 2.0   Bilirubin Total      0.0 - 1.2 mg/dL 0.3   Alkaline Phosphatase      39 - 117 IU/L 57   AST      0 - 40 IU/L 26   ALT      0 - 44 IU/L 37   Plan: routed to Dr. Heath for review.  Kate Zayas RN

## 2018-06-28 ENCOUNTER — MEDICAL CORRESPONDENCE (OUTPATIENT)
Dept: FAMILY MEDICINE | Facility: CLINIC | Age: 48
End: 2018-06-28

## 2018-08-16 DIAGNOSIS — I10 BENIGN ESSENTIAL HYPERTENSION: ICD-10-CM

## 2018-08-16 RX ORDER — LISINOPRIL 10 MG/1
TABLET ORAL
Qty: 90 TABLET | Refills: 1 | Status: SHIPPED | OUTPATIENT
Start: 2018-08-16 | End: 2019-02-09

## 2018-08-16 NOTE — TELEPHONE ENCOUNTER
lisinopril (PRINIVIL/ZESTRIL) 10 MG   LAST  Med check 7/18/17 cpx   last labs(for RX) 6/29/17  Next  appt scheduled =  None - due  Noemi Terrazas MA    BP Readings from Last 3 Encounters:   11/16/17 116/78   10/24/17 115/80   07/18/17 122/68     Last Comprehensive Metabolic Panel:  Sodium   Date Value Ref Range Status   06/29/2017 139 134 - 144 mmol/L Final     Potassium   Date Value Ref Range Status   06/29/2017 4.7 3.5 - 5.2 mmol/L Final     Chloride   Date Value Ref Range Status   06/29/2017 98 96 - 106 mmol/L Final     Glucose   Date Value Ref Range Status   06/29/2017 108 (H) 65 - 99 mg/dL Final     Urea Nitrogen   Date Value Ref Range Status   06/29/2017 13 6 - 24 mg/dL Final     BUN/Creatinine Ratio   Date Value Ref Range Status   06/29/2017 11 9 - 20 Final     Creatinine   Date Value Ref Range Status   06/29/2017 1.22 0.76 - 1.27 mg/dL Final     Calcium   Date Value Ref Range Status   06/29/2017 9.4 8.7 - 10.2 mg/dL Final

## 2019-02-09 DIAGNOSIS — I10 BENIGN ESSENTIAL HYPERTENSION: ICD-10-CM

## 2019-02-11 RX ORDER — LISINOPRIL 10 MG/1
TABLET ORAL
Qty: 30 TABLET | Refills: 0 | Status: SHIPPED | OUTPATIENT
Start: 2019-02-11 | End: 2019-03-18

## 2019-02-11 NOTE — TELEPHONE ENCOUNTER
Creatinine   Date Value Ref Range Status   06/29/2017 1.22 0.76 - 1.27 mg/dL Final     BP Readings from Last 3 Encounters:   11/16/17 116/78   10/24/17 115/80   07/18/17 122/68     DUE FOR VISIT AND LABS FOR REFILL

## 2019-03-18 DIAGNOSIS — I10 BENIGN ESSENTIAL HYPERTENSION: ICD-10-CM

## 2019-03-19 RX ORDER — LISINOPRIL 10 MG/1
10 TABLET ORAL DAILY
Qty: 30 TABLET | Refills: 0 | Status: SHIPPED | OUTPATIENT
Start: 2019-03-19

## 2019-03-26 ENCOUNTER — TELEPHONE (OUTPATIENT)
Dept: FAMILY MEDICINE | Facility: CLINIC | Age: 49
End: 2019-03-26

## 2019-03-26 NOTE — TELEPHONE ENCOUNTER
Called patient to inform him he would need to be seen before any further medication refills, He says he sees internist now and gets his presciptions through them.

## 2020-03-01 ENCOUNTER — HEALTH MAINTENANCE LETTER (OUTPATIENT)
Age: 50
End: 2020-03-01

## 2020-12-14 ENCOUNTER — HEALTH MAINTENANCE LETTER (OUTPATIENT)
Age: 50
End: 2020-12-14

## 2021-04-17 ENCOUNTER — HEALTH MAINTENANCE LETTER (OUTPATIENT)
Age: 51
End: 2021-04-17

## 2021-10-02 ENCOUNTER — HEALTH MAINTENANCE LETTER (OUTPATIENT)
Age: 51
End: 2021-10-02

## 2022-05-14 ENCOUNTER — HEALTH MAINTENANCE LETTER (OUTPATIENT)
Age: 52
End: 2022-05-14

## 2023-01-14 ENCOUNTER — HEALTH MAINTENANCE LETTER (OUTPATIENT)
Age: 53
End: 2023-01-14

## 2023-06-02 ENCOUNTER — HEALTH MAINTENANCE LETTER (OUTPATIENT)
Age: 53
End: 2023-06-02

## 2025-05-20 ENCOUNTER — HOSPITAL ENCOUNTER (EMERGENCY)
Facility: CLINIC | Age: 55
Discharge: HOME | End: 2025-05-20
Attending: EMERGENCY MEDICINE | Admitting: EMERGENCY MEDICINE
Payer: COMMERCIAL

## 2025-05-20 ENCOUNTER — APPOINTMENT (OUTPATIENT)
Dept: CT IMAGING | Facility: CLINIC | Age: 55
End: 2025-05-20
Attending: EMERGENCY MEDICINE

## 2025-05-20 VITALS
RESPIRATION RATE: 18 BRPM | WEIGHT: 195 LBS | BODY MASS INDEX: 29.55 KG/M2 | OXYGEN SATURATION: 100 % | DIASTOLIC BLOOD PRESSURE: 97 MMHG | SYSTOLIC BLOOD PRESSURE: 149 MMHG | HEART RATE: 70 BPM | HEIGHT: 68 IN | TEMPERATURE: 97.9 F

## 2025-05-20 DIAGNOSIS — S16.1XXA CERVICAL STRAIN, INITIAL ENCOUNTER: ICD-10-CM
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
## 2025-05-20 PROCEDURE — 72125 CT NECK SPINE W/O DYE: CPT

## 2025-05-20 PROCEDURE — 99284 EMERGENCY DEPT VISIT MOD MDM: CPT | Mod: 25

## 2025-05-20 PROCEDURE — 250N000013 HC RX MED GY IP 250 OP 250 PS 637: Performed by: EMERGENCY MEDICINE

## 2025-05-20 RX ORDER — IBUPROFEN 600 MG/1
600 TABLET, FILM COATED ORAL ONCE
Status: COMPLETED | OUTPATIENT
Start: 2025-05-20 | End: 2025-05-20

## 2025-05-20 RX ADMIN — IBUPROFEN 600 MG: 600 TABLET ORAL at 10:04

## 2025-05-20 ASSESSMENT — ACTIVITIES OF DAILY LIVING (ADL)
ADLS_ACUITY_SCORE: 41
ADLS_ACUITY_SCORE: 41
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
## 2025-06-01 ENCOUNTER — HEALTH MAINTENANCE LETTER (OUTPATIENT)
Age: 55
End: 2025-06-01